# Patient Record
Sex: FEMALE | Race: WHITE
[De-identification: names, ages, dates, MRNs, and addresses within clinical notes are randomized per-mention and may not be internally consistent; named-entity substitution may affect disease eponyms.]

---

## 2018-05-08 ENCOUNTER — HOSPITAL ENCOUNTER (OUTPATIENT)
Dept: HOSPITAL 68 - STS | Age: 48
End: 2018-05-08
Attending: SURGERY
Payer: COMMERCIAL

## 2018-05-08 VITALS — BODY MASS INDEX: 39.4 KG/M2 | HEIGHT: 68 IN | WEIGHT: 260 LBS

## 2018-05-08 DIAGNOSIS — Z17.1: ICD-10-CM

## 2018-05-08 DIAGNOSIS — C50.211: Primary | ICD-10-CM

## 2018-05-08 DIAGNOSIS — E07.9: ICD-10-CM

## 2018-05-08 DIAGNOSIS — F17.200: ICD-10-CM

## 2018-05-08 DIAGNOSIS — G47.33: ICD-10-CM

## 2018-05-08 DIAGNOSIS — I10: ICD-10-CM

## 2018-05-08 DIAGNOSIS — E11.9: ICD-10-CM

## 2018-05-08 PROCEDURE — C9728 PLACE DEVICE/MARKER, NON PRO: HCPCS

## 2018-05-08 NOTE — MAMMOGRAPHY REPORT
PROCEDURE:
US GUIDANCE FOR BREAST PREOPERATIVE NEEDLE LOCALIZATION, RIGHT
MM POST NEEDLE LOCALIZATION , BOTH CC, ML VIEWS, RIGHT BREAST
SPECIMEN RADIOGRAPHY
 
CLINICAL INFORMATION:
47-year-old female with history of poorly differentiated carcinoma at 1
o'clock, 9 cm from the nipple within the right breast (favor breast primary),
diagnosed on ultrasound-guided biopsy done on 10/02/2017. Recently completed
neoadjuvant chemotherapy. Preoperative needle localization is requested.
 
COMPARISON:
Prior studies done on 10/02/2017.
 
TECHNIQUE AND FINDINGS:
The details of the procedure, as well as the risks, benefits, and
alternatives to the procedure were explained to the patient in detail and all
of her questions were answered, after which, written informed consent was
obtained.
 
Prior to the procedure, sonography revealed subtle area of architectural
distortion at the site of the previously documented lobulated hypervascular
2.5 cm maximum dimension solid mass as was detected on 10/02/2017. No
residual mass is visualized. The tissue marker is not identified. Given the
subtle finding on the ultrasound, a decision was made to localize the
residual tumor using mammographic technique.
 
Accordingly, the patient was taken to the mammographic suite and right CC and
right ML views were obtained. There is no residual mass identified. Subtle
asymmetry is visualized at the mid to anterior inner part of the right breast
at the site of the previously documented tumor. On the ML projection, there
is no discrete mass visualized. The tissue marker is also not visualized
within the breast. The patient apparently has a drainage procedure performed
at upper inner quadrant of the right breast for sebaceous cyst in the
interim.
 
Due to nonvisualization of the tissue marker as well as the residual tumor,
the difficulty of localizing the previously documented tumor was discussed in
detail with Dr. Dias and the rest of the oncology team. The findings were
also reviewed with the patient.
 
Subsequently, a decision was made to proceed with ultrasound-guided
localization followed by confirmation with mammographic technique.
 
Accordingly, in the sonographic suite, a timeout was performed, the lesion
intended for needle localization was targeted and the skin of the right
breast was then prepped and draped in the usual sterile fashion.
 
Using sonographic guidance, sterile technique and buffered 2% lidocaine
without epinephrine for local anesthesia, a 5 cm Kopan's needle was placed at
the site of subtle architectural distortion at 1 o'clock, 9 cm from the
nipple.
 
The patient tolerated the procedure well.
 
Subsequently, the patient was taken to the mammographic suite and right CC
and right ML views were obtained. Both views further confirmed satisfactory
position of the localization wire to the expected site of the previously
documented malignancy at upper inner quadrant of the right breast at middle
depth.
 
The worksheet was appropriately labeled and was sent to the OR with the
patient.
 
Subsequently, following excision of the mass, the specimen radiography was
performed which revealed subtle asymmetry adjacent to the intact localization
wire (likely represents the residual lesion) within the specimen.
 
IMPRESSION:
1. Successful sonographic-guided wire localization of the right breast biopsy
proved malignancy at 1 o'clock, 9 cm from the nipple.
2. Mammographic confirmation of accurate needle localization along with
appropriate marking for presurgical roadmap with the worksheet.
3. Final specimen radiograph confirming complete, adequate excision of the
residual subtle asymmetry at the site of the previously detected biopsy
proved malignancy, and removal of intact wire.
4. Since the tissue marker placed at the time of ultrasound-guided biopsy
done on 10/02/2017 is not visualized within the right breast on the current
mammographic as well as sonographic images, the tissue marker likely has
migrated outside of the breast tissue. Accordingly, followup radiographic
images (frontal views only) of the head, neck, chest, abdomen, pelvis and
both upper and lower extremities are recommended.
 
Results were called to Dr. Dias in the operating room at the time of
imaging.
 
The histology report is pending.

## 2018-05-08 NOTE — OPERATIVE REPORT
Operative/Inv Procedure Report
Surgery Date: 05/08/18
Name of Procedure:
Right partial mastectomy with wire localization and sentinel lymph node biopsy
Pre-Operative Diagnosis:
Breast cancer
Post-Operative Diagnosis:
Same
Estimated Blood Loss: less than 50ml
Surgeon/Assistant:
Violet Dias MD
 
Anesthesia: laryngeal mask airway
Specimens:
Right partial mastectomy, cranial margin, caudal margin, medial margin, lateral 
margin, deep margin, skin margin, sentinel lymph nodes
 
Operative/Procedure Note
Note:
Patient is brought to the operating room on 05/08/2018 for definitive surgical 
management of her right breast cancer.  She was diagnosed with a clinical stage 
II triple negative breast cancer and underwent neoadjuvant chemotherapy.  She 
had a good clinical and radiologic response.  Partial mastectomy was planned.  
She was brought for wire localization and no clip was present.  The clip had 
migrated either externally or internally.  Ultrasound was used to localize an 
area of altered echotexture and mammography confirmed the landmarks in that 
region.  
She is brought to the operating room and placed under anesthesia.  2 g of Ancef 
was given and the right breast was prepped and draped in a sterile fashion using
ChloraPrep.  Cc of plain blue diluted with 2 mL of saline was injected in the 
retroareolar fashion.  Incision was planned.  Local anesthesia 1% lidocaine 
exception Marcaine was given and a curvilinear incision was made in the upper 
inner aspect of the areola.  A skin flap was created superiorly to the upper 
inner quadrant and the wire was brought into the incision.  Breast tissue was 
mobilized from the subcutaneous tissue laterally and medially to the lesion.  A 
lumpectomy was performed and specimen was removed and marked for orientation 
using margin map.  Intraoperative x-ray confirmed the wire in the specimen.  
Additional margins were taken widely in the cranial, caudal, medial, lateral, 
deep, and in position.  The anterior or skin margin left behind only dermis 
overlying the wire insertion site and palpable firmness.  A 3 x 4 BioSorb Marker
was placed in the lumpectomy cavity and fashioned to the adjacent tissue using 
Maxon sutures.  The mobilized breast tissue was closed over the Marker.  
Additional dead space was closed using Vicryl sutures and the skin was closed 
using a running Biosyn subcutaneous color stitch.
The axilla was then approached.  Local anesthesia was given.  Clavipectoral 
fascia was entered in the axilla was explored.  There was a lymph node that was 
notable for increased radiotracer and that lymph node was excised.  2 additional
lymph nodes were found deep to this lymph node with radiotracer uptake as well 
as methylene blue uptake.  These were removed and sent as sentinel lymph nodes. 
There were no other blue, palpable, or radioactive lymph nodes in the axilla.  
Hemostasis was achieved using mammary clips and electrocautery.  Clavipectoral 
fascia was closed using interrupted Vicryl sutures and the skin was closed using
a running Biosyn subcuticular stitch.  Steri-Strips and sterile dressings were 
applied and patient transferred to the recovery room in satisfactory condition 
having tolerated procedure well.